# Patient Record
(demographics unavailable — no encounter records)

---

## 2024-11-12 NOTE — HISTORY OF PRESENT ILLNESS
[FreeTextEntry1] : Carlie is a 64yo with mixed urinary incontinence, stress predominant, who presents today for follow up. At her last visit she reported her OAB and UUI were 80% improved with Gemtesa. Since that time she was switched to Trospium due to insurance coverage. Only anticholinergics were on formulary for OAB with her insurance. Today she reports discontinuing the Trospium due to severe side effects.   She underwent UDS which revealed +-300ml, no DO, custodial 300ml, PVR 25ml.   Today she reports her ALEX symptoms are more bothersome and she desires surgery.

## 2024-11-12 NOTE — DISCUSSION/SUMMARY
[FreeTextEntry1] : UUI, frequency, urgency: -She will call her insurance company and ask what needs to be done in order to get auth for beta 3 agonist -Continue behavioral and fluid modifications   ALEX:  We again reviewed management options for stress urinary incontinence including: observation, pelvic floor exercises, continence devices, periurethral bulking agents,  and surgical management. She is interested in surgery with a sling. She was counseled on risks of procedure and postop expectations and restrictions. She would like to proceed with scheduling.     Otherwise, will RTO in 12 mo for follow up, or sooner if issues arise

## 2025-02-25 NOTE — END OF VISIT
Results discussed with pt start up on tricor   [FreeTextEntry3] : I have fully participated in the care of this patient. I was physically present for the key portions of the visit. I have reviewed all pertinent clinical information, including history, physical exam, plan and the note and I agree.

## 2025-02-25 NOTE — PHYSICAL EXAM
[Chaperone Present] : A chaperone was present in the examining room during all aspects of the physical examination [Labia Majora] : were normal [Labia Minora] : were normal [Normal Appearance] : general appearance was normal [Uterine Adnexae] : were not tender and not enlarged [Normal] : no abnormalities [Exam Deferred] : was deferred [Aa ____] : Aa [unfilled] [Ba ____] : Ba [unfilled] [C ____] : C [unfilled] [GH ____] : GH [unfilled] [PB ____] : PB [unfilled] [TVL ____] : TVL  [unfilled] [Ap ____] : Ap [unfilled] [Bp ____] : Bp [unfilled] [D ____] : D [unfilled] [FreeTextEntry1] : General: Well appearing. Alert and oriented. No acute distress. HEENT: Normocephalic, atraumatic, extraocular muscles appear to be intact. Neck: Full range of motion, no obvious lymphadenopathy, deformities, or masses noted. Respiratory: Speaking in full sentences comfortably. Normal work of breathing. No cough during visit. Musculoskeletal: Active full range of motion in extremities. Skin: No obvious rash or skin lesions. Neuro: Oriented x3. Speech is fluent, normal rate. Psych: Normal mood and affect.

## 2025-02-25 NOTE — DISCUSSION/SUMMARY
[FreeTextEntry1] : Carlie is a 65 yo who presents today to discuss surgical management of her stress urinary incontinence. She desires a midurethral sling with mesh. She is asymptomatic from stage I cystocele and rectocele.    She understands she has both stress and urgency incontinence and the procedure is not designed to treat the urgency aspect of her incontinence. She will continue on the Gemtesa for her urgency until one day prior to surgery; then, she will stop until she is evaluated post-operatively to determine her symptoms, after which her OAB medication will be restarted as needed.   We reviewed risks to the procedure including, but not limited to: bleeding, infection, pain, urinary retention requiring an indwelling catheter, persistence or recurrence of stress incontinence, failure of procedure to alleviate symptoms, worsening of overactive bladder or urge incontinence, voiding dysfunction requiring the long term use of catheters, dyspareunia. We also extensively reviewed the risk of injury to the bladder, ureters, urethra, vagina, and bowel. We also discussed the risk of sling mesh exposure, fistula formation, neuropathy, erosion, pain, and need for reoperation. Risks were explained in layman's terms. She expressed understanding of these risks and continues to desire the planned surgical procedure. We discussed the possibility of going home with a catheter. We reviewed her hospital stay as well as preoperative and postoperative instructions.   Patient signed consent for: pelvic exam under anesthesia, midurethral sling with mesh, cystoscopy, possible anterior repair. A mesh specific consent was signed. Pt understands that pelvic exam under anesthesia can be performed by learners (medical students/residents/fellows).  Patient verbalized understanding.  All questions answered.

## 2025-02-25 NOTE — DISCUSSION/SUMMARY
[FreeTextEntry1] : Carlie is a 63 yo who presents today to discuss surgical management of her stress urinary incontinence. She desires a midurethral sling with mesh. She is asymptomatic from stage I cystocele and rectocele.    She understands she has both stress and urgency incontinence and the procedure is not designed to treat the urgency aspect of her incontinence. She will continue on the Gemtesa for her urgency until one day prior to surgery; then, she will stop until she is evaluated post-operatively to determine her symptoms, after which her OAB medication will be restarted as needed.   We reviewed risks to the procedure including, but not limited to: bleeding, infection, pain, urinary retention requiring an indwelling catheter, persistence or recurrence of stress incontinence, failure of procedure to alleviate symptoms, worsening of overactive bladder or urge incontinence, voiding dysfunction requiring the long term use of catheters, dyspareunia. We also extensively reviewed the risk of injury to the bladder, ureters, urethra, vagina, and bowel. We also discussed the risk of sling mesh exposure, fistula formation, neuropathy, erosion, pain, and need for reoperation. Risks were explained in layman's terms. She expressed understanding of these risks and continues to desire the planned surgical procedure. We discussed the possibility of going home with a catheter. We reviewed her hospital stay as well as preoperative and postoperative instructions.   Patient signed consent for: pelvic exam under anesthesia, midurethral sling with mesh, cystoscopy, possible anterior repair. A mesh specific consent was signed. Pt understands that pelvic exam under anesthesia can be performed by learners (medical students/residents/fellows).  Patient verbalized understanding.  All questions answered.

## 2025-02-25 NOTE — HISTORY OF PRESENT ILLNESS
[FreeTextEntry1] : Carlie is a 65yo with mixed urinary incontinence, stress predominant, here for pre-surgical counseling. Previously treated with Mirabegron and Trospium but stopped 2/2 cost and side effects, respectively. Currently on Gemtesa with better insurance coverage and adequate control of OAB sx. Patient denies any new complaints.  She continues to desire surgical management (3/6).  Her pre-operative workup is as follows:   UDS (2024): [facility] 300 mL, +-300 mL, no DO, PVR 25 cc. [x] CC UCx collected today [ ] Medical clearance   PMH: cardiac murmur (expectantly managed), constipation, diverticulosis, HLD/hypertriglyceridemia, proteinuria PSH: laparoscopic sigmoid colectomy OBGYN:  x 2; denies abnormal paps or PMB Allergies: NKDA Rx: Wegovy, zetia, crestor, Gemtesa, jardiance Fam: denies coagulopathies or gyn cancers Soc: former smoker x 20 yrs 1 pack/week quit at age 40; does not work

## 2025-02-25 NOTE — HISTORY OF PRESENT ILLNESS
[FreeTextEntry1] : Carlie is a 63yo with mixed urinary incontinence, stress predominant, here for pre-surgical counseling. Previously treated with Mirabegron and Trospium but stopped 2/2 cost and side effects, respectively. Currently on Gemtesa with better insurance coverage and adequate control of OAB sx. Patient denies any new complaints.  She continues to desire surgical management (3/6).  Her pre-operative workup is as follows:   UDS (2024): care home 300 mL, +-300 mL, no DO, PVR 25 cc. [x] CC UCx collected today [ ] Medical clearance   PMH: cardiac murmur (expectantly managed), constipation, diverticulosis, HLD/hypertriglyceridemia, proteinuria PSH: laparoscopic sigmoid colectomy OBGYN:  x 2; denies abnormal paps or PMB Allergies: NKDA Rx: Wegovy, zetia, crestor, Gemtesa, jardiance Fam: denies coagulopathies or gyn cancers Soc: former smoker x 20 yrs 1 pack/week quit at age 40; does not work

## 2025-03-19 NOTE — SUBJECTIVE
[FreeTextEntry1] : reports feels well overall. Offers no complaints. [FreeTextEntry8] : none [FreeTextEntry7] : none [FreeTextEntry6] : good [FreeTextEntry5] : reports no sx of ALEX or incomplete emptying [FreeTextEntry4] : rare constipation which is her baseline, takes miralax and colace PRN [FreeTextEntry3] : without issue

## 2025-03-19 NOTE — DISCUSSION/SUMMARY
[FreeTextEntry1] : Carlie is a 63 yo s/p mini MUS, cysto 3/6/25. She is doing well.  Postoperative restrictions, instructions, and bleeding precautions reviewed. She was counseled to call if any questions and RTO in 4 weeks or sooner, should issues arise. Patient verbalized understanding.  All questions answered.

## 2025-03-19 NOTE — OBJECTIVE
[Post Void Residual ____ ml] : Post Void Residual was [unfilled] ml [Clean, Dry, Intact] : Clean, Dry, Intact [Good Support] : Good support [Healing well] : healing well [No Masses or Tenderness] : no masses or tenderness [FreeTextEntry3] : no mesh palpated or visualized

## 2025-04-18 NOTE — OBJECTIVE
[Clean, Dry, Intact] : Clean, Dry, Intact [Good Support] : Good support [Healing well] : healing well [No Masses or Tenderness] : no masses or tenderness [FreeTextEntry3] : no mesh palpated or visualized

## 2025-04-18 NOTE — DISCUSSION/SUMMARY
[FreeTextEntry1] : Carlie is a 65 yo s/p mini MUS, cysto 3/6/25. She is doing well.  All postoperative milestones met.   She was advised that she my return to full activities and follow-up as needed.

## 2025-04-18 NOTE — DISCUSSION/SUMMARY
[FreeTextEntry1] : Carlie is a 63 yo s/p mini MUS, cysto 3/6/25. She is doing well.  All postoperative milestones met.   She was advised that she my return to full activities and follow-up as needed.